# Patient Record
Sex: FEMALE | Race: WHITE | NOT HISPANIC OR LATINO | Employment: UNEMPLOYED | URBAN - METROPOLITAN AREA
[De-identification: names, ages, dates, MRNs, and addresses within clinical notes are randomized per-mention and may not be internally consistent; named-entity substitution may affect disease eponyms.]

---

## 2018-10-28 ENCOUNTER — APPOINTMENT (OUTPATIENT)
Dept: GENERAL RADIOLOGY | Facility: HOSPITAL | Age: 28
End: 2018-10-28

## 2018-10-28 PROCEDURE — 71046 X-RAY EXAM CHEST 2 VIEWS: CPT | Performed by: GENERAL PRACTICE

## 2019-01-18 ENCOUNTER — OFFICE VISIT (OUTPATIENT)
Dept: GASTROENTEROLOGY | Facility: CLINIC | Age: 29
End: 2019-01-18

## 2019-01-18 VITALS
WEIGHT: 118.4 LBS | HEIGHT: 64 IN | SYSTOLIC BLOOD PRESSURE: 110 MMHG | TEMPERATURE: 98.4 F | DIASTOLIC BLOOD PRESSURE: 70 MMHG | BODY MASS INDEX: 20.22 KG/M2

## 2019-01-18 DIAGNOSIS — R63.4 WEIGHT LOSS, ABNORMAL: Primary | ICD-10-CM

## 2019-01-18 DIAGNOSIS — R10.13 DYSPEPSIA: ICD-10-CM

## 2019-01-18 DIAGNOSIS — R89.4 ABNORMAL CELIAC ANTIBODY PANEL: ICD-10-CM

## 2019-01-18 DIAGNOSIS — R09.89 GLOBUS SENSATION: ICD-10-CM

## 2019-01-18 PROCEDURE — 99204 OFFICE O/P NEW MOD 45 MIN: CPT | Performed by: INTERNAL MEDICINE

## 2019-01-18 RX ORDER — SODIUM CHLORIDE, SODIUM LACTATE, POTASSIUM CHLORIDE, CALCIUM CHLORIDE 600; 310; 30; 20 MG/100ML; MG/100ML; MG/100ML; MG/100ML
30 INJECTION, SOLUTION INTRAVENOUS CONTINUOUS
Status: CANCELLED | OUTPATIENT
Start: 2019-02-05

## 2019-01-18 NOTE — PATIENT INSTRUCTIONS
Schedule the EGD in about 2 weeks    Add gluten back to your diet    Continue small frequent meals    Follow up with neurology    For any additional questions, concerns or changes to your condition after today's office visit please contact the office at [phone].

## 2019-01-18 NOTE — PROGRESS NOTES
Chief Complaint   Patient presents with   • Celiac Disease   • Difficulty Swallowing       Subjective     HPI    Hollie Benitez is a 28 y.o. female with a past medical history noted below who presents for evaluation of celiac disease, globus sensation and dyspepsia.  Symptoms started following delivery of her daughter in May.  She noticed that she began to have a lot of nausea and early satiety with eating meals.  She felt overly full and would not eat as much at a time.  She lost all of her baby weight and then lost 20 pounds from her pre-baby weight.  She had no associated vomiting, no diarrhea.  She had no significant abdominal pain.  She finally saw her PCP November 2018.  Lab testing showed a deaminated gliadin antibody that was positive however her tissue transglutaminase levels were normal.  She was told that she had celiac disease. She started a gluten-free diet, at first she felt bad and then after couple weeks she started to feel better again.  Interestingly, she now has developed complaints of upper muscular weakness including in her arms.  It is now progressing into her neck and her head.  She is now complaining of complaints with issues with swallowing.  She mostly feels a globus sensation.  She is not having any food gets stuck.  She has seen a neurologist at [facility] and myasthenia gravis and guillain barre has been ruled out.  Was hospitalized at [facility] last week and was told that she had spinal stenosis.    There is no family history of celiac disease.  There is no family history of inflammatory bowel disease, nor GI malignancies.    She works a desk job.  She does not smoke or drink alcohol.    Review of her records at [facility]- she had a brief hospitalization earlier this month--she had negative MRI imaging, negative gallbladder ultrasound.  H. pylori testing was negative.  Her TSH was upper limit of normal.  She has had thyroid testing that has been negative.  She has had negative inflammatory  markers. Her B12 is normal.    She is beginning to eat more.  No longer breast feeding.        Past Medical History:   Diagnosis Date   • Celiac disease 2018   • Vitamin D deficiency          Current Outpatient Medications:   •  amoxicillin-clavulanate (AUGMENTIN) 875-125 MG per tablet, Take 1 tablet by mouth 2 (Two) Times a Day., Disp: 20 tablet, Rfl: 0    No Known Allergies    Social History     Socioeconomic History   • Marital status:      Spouse name: Not on file   • Number of children: Not on file   • Years of education: Not on file   • Highest education level: Not on file   Social Needs   • Financial resource strain: Not on file   • Food insecurity - worry: Not on file   • Food insecurity - inability: Not on file   • Transportation needs - medical: Not on file   • Transportation needs - non-medical: Not on file   Occupational History   • Not on file   Tobacco Use   • Smoking status: Never Smoker   • Smokeless tobacco: Never Used   Substance and Sexual Activity   • Alcohol use: Yes     Comment: social    • Drug use: No   • Sexual activity: Not on file   Other Topics Concern   • Not on file   Social History Narrative   • Not on file       History reviewed. No pertinent family history.    Review of Systems   Constitutional: Positive for unexpected weight change. Negative for activity change, appetite change and fatigue.   HENT: Negative for sore throat and trouble swallowing.    Respiratory: Negative.    Cardiovascular: Negative.    Gastrointestinal: Positive for abdominal pain. Negative for abdominal distention and blood in stool.   Endocrine: Negative for cold intolerance and heat intolerance.   Genitourinary: Negative for difficulty urinating, dysuria and frequency.   Musculoskeletal: Negative for arthralgias, back pain and myalgias.   Skin: Negative.    Neurological:        Tingling in her arms   Hematological: Negative for adenopathy. Does not bruise/bleed easily.   All other systems reviewed and are  negative.      Objective     Vitals:    01/18/19 1314   BP: 110/70   Temp: 98.4 °F (36.9 °C)         01/18/19  1314   Weight: 53.7 kg (118 lb 6.4 oz)     Body mass index is 20.32 kg/m².    Physical Exam   Constitutional: She is oriented to person, place, and time. She appears well-developed and well-nourished. No distress.   HENT:   Head: Normocephalic and atraumatic.   Right Ear: External ear normal.   Left Ear: External ear normal.   Nose: Nose normal.   Mouth/Throat: Oropharynx is clear and moist.   Eyes: Conjunctivae and EOM are normal. Right eye exhibits no discharge. Left eye exhibits no discharge. No scleral icterus.   Neck: Normal range of motion. Neck supple. No thyromegaly present.   No supraclavicular adenopathy   Cardiovascular: Normal rate, regular rhythm, normal heart sounds and intact distal pulses. Exam reveals no gallop.   No murmur heard.  No lower extremity edema   Pulmonary/Chest: Effort normal and breath sounds normal. No respiratory distress. She has no wheezes.   Abdominal: Soft. Normal appearance and bowel sounds are normal. She exhibits no distension and no mass. There is no hepatosplenomegaly. There is no tenderness. There is no rigidity, no rebound and no guarding. No hernia.   Genitourinary:   Genitourinary Comments: Rectal exam deferred   Musculoskeletal: Normal range of motion. She exhibits no edema or tenderness.   No atrophy of upper or lower extremities.  Normal digits and nails of both hands.   Lymphadenopathy:     She has no cervical adenopathy.   Neurological: She is alert and oriented to person, place, and time. She displays no atrophy. Coordination normal.   Skin: Skin is warm and dry. No rash noted. She is not diaphoretic. No erythema.   Psychiatric: She has a normal mood and affect. Her behavior is normal. Judgment and thought content normal.   Vitals reviewed.      No results found for: WBC, RBC, HGB, HCT, MCV, MCH, MCHC, RDW, RDWSD, MPV, PLT, NEUTRORELPCT, LYMPHORELPCT,  MONORELPCT, EOSRELPCT, BASORELPCT, AUTOIGPER, NEUTROABS, LYMPHSABS, MONOSABS, EOSABS, BASOSABS, AUTOIGNUM, NRBC    No results found for: GLUCOSE, NA, K, CO2, CL, ANIONGAP, CREATININE, BUN, BCR, CALCIUM, EGFRIFNONA, ALKPHOS, PROTEINTOT, ALT, AST, BILITOT, ALBUMIN, GLOB, LABIL2      Imaging Results (last 7 days)     ** No results found for the last 168 hours. **            No notes on file    Assessment/Plan    Dyspepsia: She is describing generalized symptoms of gastric discomfort following meals.  Unclear origin but I do not think this is celiac disease.  His latest significant anorexia and weight loss    Globus sensation:?  Related to these unusual neurologic symptoms.  She is following with neurology and myasthenia gravis and Guillain-Barré have been ruled out    Weight loss: With above    Positive celiac antibodies: I do not think she has celiac disease but we'll ultimately need to confirm with EGD    Plan  EGD for further evaluation of this possibility of celiac disease but primarily for her significant dyspepsia symptoms leading to weight loss  She is to resume eating gluten in her diet   Continue frequent small meals   Continue to follow up with neurology, unfortunately don't have much to offer in terms of what is going on with these unusual neurologic symptoms, we did discuss the possibility of polymyalgia rheumatica so she may ultimately need rheumatology    Hollie was seen today for celiac disease and difficulty swallowing.    Diagnoses and all orders for this visit:    Weight loss, abnormal  -     Case Request; Standing  -     Follow Anesthesia Guidelines / Standing Orders; Future  -     Implement Anesthesia Orders Day of Procedure; Standing  -     Obtain Informed Consent; Standing  -     lactated ringers infusion; Infuse 30 mL/hr into a venous catheter Continuous.  -     Case Request    Globus sensation  -     Case Request; Standing  -     Follow Anesthesia Guidelines / Standing Orders; Future  -      Implement Anesthesia Orders Day of Procedure; Standing  -     Obtain Informed Consent; Standing  -     lactated ringers infusion; Infuse 30 mL/hr into a venous catheter Continuous.  -     Case Request    Abnormal celiac antibody panel  -     Case Request; Standing  -     Follow Anesthesia Guidelines / Standing Orders; Future  -     Implement Anesthesia Orders Day of Procedure; Standing  -     Obtain Informed Consent; Standing  -     lactated ringers infusion; Infuse 30 mL/hr into a venous catheter Continuous.  -     Case Request    Dyspepsia        I have discussed the above plan with the patient.  They verbalize understanding and are in agreement with the plan.  They have been advised to contact the office for any questions, concerns, or changes related to their health.    Dictated utilizing Dragon dictation

## 2019-02-05 ENCOUNTER — HOSPITAL ENCOUNTER (OUTPATIENT)
Facility: HOSPITAL | Age: 29
Setting detail: HOSPITAL OUTPATIENT SURGERY
Discharge: HOME | End: 2019-02-05
Attending: INTERNAL MEDICINE | Admitting: INTERNAL MEDICINE

## 2019-02-05 ENCOUNTER — ANESTHESIA (OUTPATIENT)
Dept: GASTROENTEROLOGY | Facility: HOSPITAL | Age: 29
End: 2019-02-05

## 2019-02-05 ENCOUNTER — ANESTHESIA EVENT (OUTPATIENT)
Dept: GASTROENTEROLOGY | Facility: HOSPITAL | Age: 29
End: 2019-02-05

## 2019-02-05 VITALS
OXYGEN SATURATION: 100 % | WEIGHT: 120 LBS | HEIGHT: 64 IN | HEART RATE: 70 BPM | TEMPERATURE: 98 F | RESPIRATION RATE: 16 BRPM | DIASTOLIC BLOOD PRESSURE: 66 MMHG | SYSTOLIC BLOOD PRESSURE: 91 MMHG | BODY MASS INDEX: 20.49 KG/M2

## 2019-02-05 DIAGNOSIS — R89.4 ABNORMAL CELIAC ANTIBODY PANEL: ICD-10-CM

## 2019-02-05 DIAGNOSIS — R63.4 WEIGHT LOSS, ABNORMAL: ICD-10-CM

## 2019-02-05 DIAGNOSIS — R09.89 GLOBUS SENSATION: ICD-10-CM

## 2019-02-05 LAB
B-HCG UR QL: NEGATIVE
INTERNAL NEGATIVE CONTROL: NEGATIVE
INTERNAL POSITIVE CONTROL: POSITIVE
Lab: NORMAL

## 2019-02-05 PROCEDURE — 43239 EGD BIOPSY SINGLE/MULTIPLE: CPT | Performed by: INTERNAL MEDICINE

## 2019-02-05 PROCEDURE — 25010000002 PROPOFOL 10 MG/ML EMULSION: Performed by: ANESTHESIOLOGY

## 2019-02-05 PROCEDURE — 88305 TISSUE EXAM BY PATHOLOGIST: CPT | Performed by: INTERNAL MEDICINE

## 2019-02-05 PROCEDURE — 81025 URINE PREGNANCY TEST: CPT | Performed by: INTERNAL MEDICINE

## 2019-02-05 RX ORDER — SODIUM CHLORIDE 0.9 % (FLUSH) 0.9 %
3-10 SYRINGE (ML) INJECTION AS NEEDED
Status: DISCONTINUED | OUTPATIENT
Start: 2019-02-05 | End: 2019-02-05 | Stop reason: HOSPADM

## 2019-02-05 RX ORDER — PROPOFOL 10 MG/ML
VIAL (ML) INTRAVENOUS CONTINUOUS PRN
Status: DISCONTINUED | OUTPATIENT
Start: 2019-02-05 | End: 2019-02-05 | Stop reason: SURG

## 2019-02-05 RX ORDER — SODIUM CHLORIDE, SODIUM LACTATE, POTASSIUM CHLORIDE, CALCIUM CHLORIDE 600; 310; 30; 20 MG/100ML; MG/100ML; MG/100ML; MG/100ML
30 INJECTION, SOLUTION INTRAVENOUS CONTINUOUS
Status: DISCONTINUED | OUTPATIENT
Start: 2019-02-05 | End: 2019-02-05 | Stop reason: HOSPADM

## 2019-02-05 RX ORDER — PROPOFOL 10 MG/ML
VIAL (ML) INTRAVENOUS AS NEEDED
Status: DISCONTINUED | OUTPATIENT
Start: 2019-02-05 | End: 2019-02-05 | Stop reason: SURG

## 2019-02-05 RX ORDER — GLYCOPYRROLATE 0.2 MG/ML
INJECTION INTRAMUSCULAR; INTRAVENOUS AS NEEDED
Status: DISCONTINUED | OUTPATIENT
Start: 2019-02-05 | End: 2019-02-05 | Stop reason: SURG

## 2019-02-05 RX ORDER — SODIUM CHLORIDE 0.9 % (FLUSH) 0.9 %
3 SYRINGE (ML) INJECTION EVERY 12 HOURS SCHEDULED
Status: DISCONTINUED | OUTPATIENT
Start: 2019-02-05 | End: 2019-02-05 | Stop reason: HOSPADM

## 2019-02-05 RX ORDER — LIDOCAINE HYDROCHLORIDE 20 MG/ML
INJECTION, SOLUTION INFILTRATION; PERINEURAL AS NEEDED
Status: DISCONTINUED | OUTPATIENT
Start: 2019-02-05 | End: 2019-02-05 | Stop reason: SURG

## 2019-02-05 RX ADMIN — PROPOFOL 100 MCG/KG/MIN: 10 INJECTION, EMULSION INTRAVENOUS at 10:10

## 2019-02-05 RX ADMIN — PROPOFOL 100 MG: 10 INJECTION, EMULSION INTRAVENOUS at 10:10

## 2019-02-05 RX ADMIN — SODIUM CHLORIDE, POTASSIUM CHLORIDE, SODIUM LACTATE AND CALCIUM CHLORIDE 30 ML/HR: 600; 310; 30; 20 INJECTION, SOLUTION INTRAVENOUS at 09:33

## 2019-02-05 RX ADMIN — GLYCOPYRROLATE 0.2 MCG: 0.2 INJECTION INTRAMUSCULAR; INTRAVENOUS at 10:10

## 2019-02-05 RX ADMIN — LIDOCAINE HYDROCHLORIDE 60 MG: 20 INJECTION, SOLUTION INFILTRATION; PERINEURAL at 10:13

## 2019-02-05 NOTE — ANESTHESIA POSTPROCEDURE EVALUATION
"Patient: Hollie Benitez    Procedure Summary     Date:  02/05/19 Room / Location:   [facility]    Anesthesia Start:  1011 Anesthesia Stop:  1036    Procedure:  ESOPHAGOGASTRODUODENOSCOPY WITH BX'S (N/A Esophagus) Diagnosis:       Weight loss, abnormal      Globus sensation      Abnormal celiac antibody panel      (Weight loss, abnormal [R63.4])      (Globus sensation [F45.8])      (Abnormal celiac antibody panel [R89.4])    Surgeon:  Aliya Reyes MD Provider:  Joe Parker MD    Anesthesia Type:  MAC ASA Status:  2          Anesthesia Type: MAC  Last vitals  BP   91/66 (02/05/19 1052)   Temp   36.7 °C (98 °F) (02/05/19 0927)   Pulse   70 (02/05/19 1052)   Resp   16 (02/05/19 1052)     SpO2   100 % (02/05/19 1052)     Post Anesthesia Care and Evaluation    Patient location during evaluation: PACU  Patient participation: complete - patient participated  Level of consciousness: awake  Pain score: 0  Pain management: adequate  Airway patency: patent  Anesthetic complications: No anesthetic complications  PONV Status: none  Cardiovascular status: acceptable  Respiratory status: acceptable  Hydration status: acceptable    Comments: BP 91/66 (BP Location: Left arm, Patient Position: Sitting)   Pulse 70   Temp 36.7 °C (98 °F) (Oral)   Resp 16   Ht 162.6 cm (64\")   Wt 54.4 kg (120 lb)   LMP 01/21/2019   SpO2 100%   BMI 20.60 kg/m²       "

## 2019-02-05 NOTE — DISCHARGE INSTRUCTIONS
For the next 24 hours patient needs to be with a responsible adult.    For 24 hours DO NOT drive, operate machinery, appliances, drink alcohol, make important decisions or sign legal documents.    Start with a light or bland diet and advance to regular diet as tolerated.    Follow recommendations on procedure report provided by your doctor.    Call Dr Reyes for problems [phone]    Problems may include but not limited to: large amounts of bleeding, trouble breathing, repeated vomiting, severe unrelieved pain, fever or chills.

## 2019-02-05 NOTE — ANESTHESIA PREPROCEDURE EVALUATION
Anesthesia Evaluation     Patient summary reviewed and Nursing notes reviewed                Airway   Mallampati: I  TM distance: >3 FB  Neck ROM: full  No difficulty expected  Dental - normal exam     Pulmonary - negative pulmonary ROS and normal exam   Cardiovascular - negative cardio ROS and normal exam        Neuro/Psych- negative ROS  GI/Hepatic/Renal/Endo - negative ROS     Musculoskeletal (-) negative ROS    Abdominal  - normal exam    Bowel sounds: normal.   Substance History - negative use     OB/GYN negative ob/gyn ROS         Other                        Anesthesia Plan    ASA 2     MAC     Anesthetic plan, all risks, benefits, and alternatives have been provided, discussed and informed consent has been obtained with: patient.

## 2019-02-06 LAB
CYTO UR: NORMAL
LAB AP CASE REPORT: NORMAL
LAB AP DIAGNOSIS COMMENT: NORMAL
PATH REPORT.FINAL DX SPEC: NORMAL
PATH REPORT.GROSS SPEC: NORMAL

## 2019-02-07 ENCOUNTER — TELEPHONE (OUTPATIENT)
Dept: GASTROENTEROLOGY | Facility: CLINIC | Age: 29
End: 2019-02-07

## 2019-02-07 NOTE — TELEPHONE ENCOUNTER
Called pt and advised per Dr Reyes that the bx of her sm bowel showed a very min increased amount of lymphocytes (a type of blood cell ) but  Was not consistent with a diagnosis of celiac disease.  It is ok for her to continue to eat gluten.  She would  Like to repeat her celiac labs while she is on a gluten diet for 3 mo and f/u with her at that time.     The bx of her stomach were normal , she had some very mild inflammation of the ge junction which is normal.     Pt verb understanding and made appt for 05/04 at 3p with Dr Reyes.

## 2019-02-07 NOTE — TELEPHONE ENCOUNTER
----- Message from Aliya Reyes MD sent at 2/7/2019 12:59 PM EST -----  Biopsies of her small bowel showed a very minimally increased amount of lymphocytes (a type of blood cell) but was not consistent with a diagnosis with celiac disease.  It is okay for her to continue to eat gluten.  I would like to repeat her celiac labs while she is on a gluten diet in 3 months--she should follow-up with me in office at that time.    The biopsies of her stomach were normal, she had some very mild inflammation of the GE junction which is normal.

## 2019-02-07 NOTE — PROGRESS NOTES
Biopsies of her small bowel showed a very minimally increased amount of lymphocytes (a type of blood cell) but was not consistent with a diagnosis with celiac disease.  It is okay for her to continue to eat gluten.  I would like to repeat her celiac labs while she is on a gluten diet in 3 months--she should follow-up with me in office at that time.    The biopsies of her stomach were normal, she had some very mild inflammation of the GE junction which is normal.

## 2021-04-16 ENCOUNTER — BULK ORDERING (OUTPATIENT)
Dept: CASE MANAGEMENT | Facility: OTHER | Age: 31
End: 2021-04-16

## 2021-04-16 DIAGNOSIS — Z23 IMMUNIZATION DUE: ICD-10-CM

## (undated) DEVICE — CANN NASL CO2 TRULINK W/O2 A/

## (undated) DEVICE — TUBING, SUCTION, 1/4" X 10', STRAIGHT: Brand: MEDLINE

## (undated) DEVICE — BITEBLOCK OMNI BLOC

## (undated) DEVICE — Device: Brand: DEFENDO AIR/WATER/SUCTION AND BIOPSY VALVE